# Patient Record
Sex: FEMALE | Race: WHITE | NOT HISPANIC OR LATINO | URBAN - METROPOLITAN AREA
[De-identification: names, ages, dates, MRNs, and addresses within clinical notes are randomized per-mention and may not be internally consistent; named-entity substitution may affect disease eponyms.]

---

## 2017-11-03 ENCOUNTER — FOLLOW UP (OUTPATIENT)
Dept: URBAN - METROPOLITAN AREA CLINIC 33 | Facility: CLINIC | Age: 67
End: 2017-11-03

## 2017-11-03 VITALS
SYSTOLIC BLOOD PRESSURE: 155 MMHG | DIASTOLIC BLOOD PRESSURE: 95 MMHG | BODY MASS INDEX: 28.63 KG/M2 | HEART RATE: 67 BPM | WEIGHT: 200 LBS | HEIGHT: 70 IN

## 2017-11-03 DIAGNOSIS — H44.442: ICD-10-CM

## 2017-11-03 DIAGNOSIS — T15.82XA: ICD-10-CM

## 2017-11-03 DIAGNOSIS — H35.372: ICD-10-CM

## 2017-11-03 DIAGNOSIS — H33.002: ICD-10-CM

## 2017-11-03 DIAGNOSIS — H35.361: ICD-10-CM

## 2017-11-03 DIAGNOSIS — H35.352: ICD-10-CM

## 2017-11-03 DIAGNOSIS — H04.123: ICD-10-CM

## 2017-11-03 PROCEDURE — 65222 REMOVE FOREIGN BODY FROM EYE: CPT

## 2017-11-03 PROCEDURE — 92134 CPTRZ OPH DX IMG PST SGM RTA: CPT

## 2017-11-03 PROCEDURE — 92226 OPHTHALMOSCOPY (SUB): CPT

## 2017-11-03 PROCEDURE — 92014 COMPRE OPH EXAM EST PT 1/>: CPT

## 2017-11-03 RX ORDER — TOBRAMYCIN 3 MG/ML
1 SOLUTION/ DROPS OPHTHALMIC EVERY 4 HOURS
End: 2017-11-10

## 2017-11-03 ASSESSMENT — VISUAL ACUITY
OD_CC: 20/40+1
OS_CC: 20/200

## 2017-11-03 ASSESSMENT — TONOMETRY
OD_IOP_MMHG: 12
OS_IOP_MMHG: 12

## 2018-01-10 NOTE — PATIENT DISCUSSION
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

## 2018-01-10 NOTE — PATIENT DISCUSSION
Patient educated with [employer] for distance, they will need glasses for all near and intermediate activities after surgery. Patient educated there is a 10% chance of needing enhancement after surgery. Patient elects [employer] [facility] OD, goal of emmetropia.

## 2019-04-22 ENCOUNTER — FOLLOW UP (OUTPATIENT)
Dept: URBAN - METROPOLITAN AREA CLINIC 33 | Facility: CLINIC | Age: 69
End: 2019-04-22

## 2019-04-22 VITALS — HEIGHT: 55 IN | SYSTOLIC BLOOD PRESSURE: 148 MMHG | HEART RATE: 80 BPM | DIASTOLIC BLOOD PRESSURE: 100 MMHG

## 2019-04-22 DIAGNOSIS — H35.352: ICD-10-CM

## 2019-04-22 DIAGNOSIS — H33.002: ICD-10-CM

## 2019-04-22 DIAGNOSIS — H35.372: ICD-10-CM

## 2019-04-22 PROCEDURE — 92014 COMPRE OPH EXAM EST PT 1/>: CPT

## 2019-04-22 ASSESSMENT — TONOMETRY
OD_IOP_MMHG: 13
OS_IOP_MMHG: 13

## 2019-04-22 ASSESSMENT — VISUAL ACUITY
OS_CC: 20/400
OD_CC: 20/30

## 2022-06-04 ENCOUNTER — TELEPHONE ENCOUNTER (OUTPATIENT)
Dept: URBAN - METROPOLITAN AREA CLINIC 68 | Facility: CLINIC | Age: 72
End: 2022-06-04

## 2022-06-05 ENCOUNTER — TELEPHONE ENCOUNTER (OUTPATIENT)
Dept: URBAN - METROPOLITAN AREA CLINIC 68 | Facility: CLINIC | Age: 72
End: 2022-06-05

## 2022-06-25 ENCOUNTER — TELEPHONE ENCOUNTER (OUTPATIENT)
Age: 72
End: 2022-06-25

## 2022-06-26 ENCOUNTER — TELEPHONE ENCOUNTER (OUTPATIENT)
Age: 72
End: 2022-06-26